# Patient Record
Sex: FEMALE | Race: WHITE | NOT HISPANIC OR LATINO | URBAN - NONMETROPOLITAN AREA
[De-identification: names, ages, dates, MRNs, and addresses within clinical notes are randomized per-mention and may not be internally consistent; named-entity substitution may affect disease eponyms.]

---

## 2019-01-11 ENCOUNTER — IMPORTED ENCOUNTER (OUTPATIENT)
Dept: URBAN - NONMETROPOLITAN AREA CLINIC 1 | Facility: CLINIC | Age: 39
End: 2019-01-11

## 2019-01-11 PROBLEM — H52.221: Noted: 2019-01-11

## 2019-01-11 PROBLEM — H52.11: Noted: 2019-01-11

## 2019-01-11 PROCEDURE — S0621 ROUTINE OPHTHALMOLOGICAL EXA: HCPCS

## 2019-01-11 NOTE — PATIENT DISCUSSION
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

## 2022-04-09 ASSESSMENT — TONOMETRY
OS_IOP_MMHG: 12
OD_IOP_MMHG: 13

## 2022-04-09 ASSESSMENT — VISUAL ACUITY
OD_CC: 20/25-
OS_CC: 20/20

## 2025-07-18 ENCOUNTER — COMPREHENSIVE EXAM (OUTPATIENT)
Age: 45
End: 2025-07-18

## 2025-07-18 DIAGNOSIS — H52.221: ICD-10-CM

## 2025-07-18 DIAGNOSIS — H52.11: ICD-10-CM

## 2025-07-18 PROCEDURE — 92014 COMPRE OPH EXAM EST PT 1/>: CPT

## 2025-07-18 PROCEDURE — 92015 DETERMINE REFRACTIVE STATE: CPT
